# Patient Record
Sex: FEMALE | Race: BLACK OR AFRICAN AMERICAN | Employment: FULL TIME | URBAN - METROPOLITAN AREA
[De-identification: names, ages, dates, MRNs, and addresses within clinical notes are randomized per-mention and may not be internally consistent; named-entity substitution may affect disease eponyms.]

---

## 2017-10-26 ENCOUNTER — HOSPITAL ENCOUNTER (OUTPATIENT)
Dept: PHYSICAL THERAPY | Age: 47
Discharge: HOME | End: 2017-10-26
Payer: COMMERCIAL

## 2017-10-26 PROCEDURE — 97161 PT EVAL LOW COMPLEX 20 MIN: CPT | Performed by: PHYSICAL THERAPIST

## 2017-10-26 NOTE — PROGRESS NOTES
[facility] PHYSICAL THERAPY - DAILY TREATMENT NOTE    Patient Name: Stephania Leyva        Date: 10/26/2017  : 1970   yes Patient  Verified  Visit #:     Insurance: Payor: Amara Mata / Plan: [employer] / Product Type: HMO /      In time: 555 Out time: 630   Total Treatment Time: 35     Medicare Time Tracking (below)   Total Timed Codes (min):  na 1:1 Treatment Time:  na     TREATMENT AREA =  Midline low back pain [M54.5]    SUBJECTIVE  Pain Level (on 0 to 10 scale):  ie  / 10   Medication Changes/New allergies or changes in medical history, any new surgeries or procedures?    no  If yes, update Summary List   Subjective Functional Status/Changes:  []  No changes reported     See ie          OBJECTIVE         min Patient Education:  yes  Reviewed HEP   []  Progressed/Changed HEP based on: Other Objective/Functional Measures:    Demo hep without increase in pain  See ie     Post Treatment Pain Level (on 0 to 10) scale:   ie  / 10     ASSESSMENT  Assessment/Changes in Function:     See ie     []  See Progress Note/Recertification   Patient will continue to benefit from skilled PT services to modify and progress therapeutic interventions, address functional mobility deficits, address ROM deficits, address strength deficits, analyze and address soft tissue restrictions, analyze and cue movement patterns, analyze and modify body mechanics/ergonomics, assess and modify postural abnormalities and instruct in home and community integration to attain remaining goals. Progress toward goals / Updated goals:    See ie     PLAN  []  Upgrade activities as tolerated yes Continue plan of care   []  Discharge due to :    []  Other:      Therapist: Debbie Hdez PT    Date: 10/26/2017 Time: 6:35 PM     No future appointments.

## 2017-10-30 ENCOUNTER — HOSPITAL ENCOUNTER (OUTPATIENT)
Dept: PHYSICAL THERAPY | Age: 47
Discharge: HOME | End: 2017-10-30
Payer: COMMERCIAL

## 2017-10-30 PROCEDURE — 97140 MANUAL THERAPY 1/> REGIONS: CPT | Performed by: PHYSICAL THERAPIST

## 2017-10-30 PROCEDURE — 97110 THERAPEUTIC EXERCISES: CPT | Performed by: PHYSICAL THERAPIST

## 2017-10-30 NOTE — PROGRESS NOTES
[facility] Castro PHYSICAL THERAPY - DAILY TREATMENT NOTE    Patient Name: Rachel Vanessa        Date: 10/30/2017  : 1970   yes Patient  Verified  Visit #:   2   of   8  Insurance: Payor: Makenna Marie / Plan: [address] PT / Product Type: Commerical /      In time: 758 Out time: 840   Total Treatment Time: 42     Medicare Time Tracking (below)   Total Timed Codes (min):  na 1:1 Treatment Time:  na     TREATMENT AREA =  Midline low back pain [M54.5]    SUBJECTIVE  Pain Level (on 0 to 10 scale):  6  / 10   Medication Changes/New allergies or changes in medical history, any new surgeries or procedures?    no  If yes, update Summary List   Subjective Functional Status/Changes:  []  No changes reported     I was in a 9/10 when I woke up this morning and it has slowly gotten better. I still cannot twist or bend.            OBJECTIVE  Modalities Rationale:     time constraint to improve patient's ability to time constraint    min [] Estim, type/location:                                      []  att     []  unatt     []  w/US     []  w/ice    []  w/heat    min []  Mechanical Traction: type/lbs                   []  pro   []  sup   []  int   []  cont    []  before manual    []  after manual    min []  Ultrasound, settings/location:      min []  Iontophoresis w/ dexamethasone, location:                                               []  take home patch       []  in clinic    min []  Ice     []  Heat    location/position:     min []  Vasopneumatic Device, press/temp:     min []  Other:    [] Skin assessment post-treatment (if applicable):    []  intact    []  redness- no adverse reaction     []redness  adverse reaction:        25 min Therapeutic Exercise:  [x]  See flow sheet   Rationale:      increase ROM and increase strength to improve the patients ability to complete adls     17 min Manual Therapy: Mfr lumbosacral junction, thoracolumbar/lumbosacral distraction, LS lamina release, grade I -ii R uniglides l1-4, stm ls paraspinals    Rationale:      decrease pain, increase ROM, increase tissue extensibility and decrease trigger points to improve patient's ability to complete adls       min Patient Education:  yes  Reviewed HEP   []  Progressed/Changed HEP based on: Other Objective/Functional Measures:    Increased soft tissue restrictions along L lumbosacral junction, cavitation noted at l4 with uniglides  Cues fore prone hip ext on form     Post Treatment Pain Level (on 0 to 10) scale:   2 / 10     ASSESSMENT  Assessment/Changes in Function:     Reduced pain noted after initial PT session      []  See Progress Note/Recertification   Patient will continue to benefit from skilled PT services to modify and progress therapeutic interventions, address functional mobility deficits, address ROM deficits, address strength deficits, analyze and address soft tissue restrictions, analyze and cue movement patterns, analyze and modify body mechanics/ergonomics, assess and modify postural abnormalities, address imbalance/dizziness and instruct in home and community integration to attain remaining goals.    Progress toward goals / Updated goals:    Pt compliant with hep     PLAN  []  Upgrade activities as tolerated yes Continue plan of care   []  Discharge due to :    []  Other:      Therapist: Joao Montanez PT    Date: 10/30/2017 Time: 8:37 AM     Future Appointments  Date Time Provider sImael Bravo   11/3/2017 2:00 PM Kike Polanco PT [facility]   11/7/2017 5:00 PM [address] [place] [address],  [address]   11/10/2017 10:30 AM Kike Polanco PT [facility]   11/14/2017 4:30 PM Joao Montanez PT [facility]   11/17/2017 6:30 AM Kike Polanco PT [facility]   11/20/2017 4:00 PM Joao Montanez PT [facility]   11/22/2017 4:30 PM Joao Montanez, PT [facility]

## 2017-11-03 ENCOUNTER — HOSPITAL ENCOUNTER (OUTPATIENT)
Dept: PHYSICAL THERAPY | Age: 47
Discharge: HOME | End: 2017-11-03
Payer: COMMERCIAL

## 2017-11-03 PROCEDURE — 97140 MANUAL THERAPY 1/> REGIONS: CPT

## 2017-11-03 PROCEDURE — 97110 THERAPEUTIC EXERCISES: CPT

## 2017-11-03 NOTE — PROGRESS NOTES
PHYSICAL THERAPY - DAILY TREATMENT NOTE    Patient Name: Michelle Vergara        Date: 11/3/2017  : 1970   YES Patient  Verified  Visit #:   3   of   8  Insurance: Payor: [address] [place] [id number] / Plan: [address] [place] [address] Rd PT / Product Type: Commerical /      In time: 2:00 Out time: 2:45   Total Treatment Time: 45     Medicare Time Tracking (below)   Total Timed Codes (min):  na 1:1 Treatment Time:  na     TREATMENT AREA =  Midline low back pain [M54.5]    SUBJECTIVE  Pain Level (on 0 to 10 scale):  4-5  / 10   Medication Changes/New allergies or changes in medical history, any new surgeries or procedures?     NO    If yes, update Summary List   Subjective Functional Status/Changes:  []  No changes reported     No new c/o          OBJECTIVE  Modalities Rationale:     decrease inflammation and decrease pain to improve patient's ability to perform ADLs   min [] Estim, type/location:                                      []  att     []  unatt     []  w/US     []  w/ice    []  w/heat    min []  Mechanical Traction: type/lbs                   []  pro   []  sup   []  int   []  cont    []  before manual    []  after manual    min []  Ultrasound, settings/location:      min []  Iontophoresis w/ dexamethasone, location:                                               []  take home patch       []  in clinic   10 min [x]  Ice     []  Heat    location/position:     min []  Vasopneumatic Device, press/temp:     min []  Other:    [x] Skin assessment post-treatment (if applicable):    [x]  intact    []  redness- no adverse reaction     []redness  adverse reaction:        25 min Therapeutic Exercise:  [x]  See flow sheet   Rationale:      increase ROM, increase strength and improve coordination to improve the patients ability to perform ADLs     10 min Manual Therapy: DTM para, L4-5 L ERS toño   Rationale:      decrease pain, increase ROM and increase tissue extensibility to improve patient's ability to perform ADLs       min Patient Education:  YES  Reviewed HEP   []  Progressed/Changed HEP based on: Other Objective/Functional Measures:    Increased soft tissue tension noted at L4-5 para      Post Treatment Pain Level (on 0 to 10) scale:   3-4  / 10     ASSESSMENT  Assessment/Changes in Function:     Good suni to all Rx without increase in pain      []  See Progress Note/Recertification   Patient will continue to benefit from skilled PT services to modify and progress therapeutic interventions, address functional mobility deficits, address ROM deficits, address strength deficits, analyze and address soft tissue restrictions, analyze and cue movement patterns, analyze and modify body mechanics/ergonomics and assess and modify postural abnormalities to attain remaining goals.    Progress toward goals / Updated goals:    No sig change towards LTGs      PLAN  []  Upgrade activities as tolerated YES Continue plan of care   []  Discharge due to :    []  Other:      Therapist: Brianna Mena, PT, OCS, SCS, CSCS    Date: 11/3/2017 Time: 1:31 PM       Future Appointments  Date Time Provider Ismael Bravo   11/3/2017 2:00 PM Jose Diaz, PT [facility]   11/7/2017 5:00 PM [address] [place] [address], [address] [place] [address]   11/10/2017 10:30 AM Jose Diaz PT [facility]   11/14/2017 4:30 PM Mary Mccrary, PT [facility]   11/17/2017 6:30 AM Jose Diaz, PT [facility]   11/20/2017 4:00 PM [address] [place] [address], [address] [place] [address]   11/22/2017 4:30 PM Mary Mccrary, PT [facility]

## 2017-11-07 ENCOUNTER — HOSPITAL ENCOUNTER (OUTPATIENT)
Dept: PHYSICAL THERAPY | Age: 47
Discharge: HOME | End: 2017-11-07
Payer: COMMERCIAL

## 2017-11-07 PROCEDURE — 97110 THERAPEUTIC EXERCISES: CPT | Performed by: PHYSICAL THERAPIST

## 2017-11-07 PROCEDURE — 97140 MANUAL THERAPY 1/> REGIONS: CPT | Performed by: PHYSICAL THERAPIST

## 2017-11-07 NOTE — PROGRESS NOTES
PHYSICAL THERAPY - DAILY TREATMENT NOTE    Patient Name: Edd Rust        Date: 2017  : 1970   yes Patient  Verified  Visit #:   4      8  Insurance: Payor: Unknown Ades / Plan: [address] PT / Product Type: Commerical /      In time: 4:58 Out time: 5:36   Total Treatment Time: 35     Medicare Time Tracking (below)   Total Timed Codes (min):  na 1:1 Treatment Time:  na     TREATMENT AREA =  Midline low back pain [M54.5]    SUBJECTIVE  Pain Level (on 0 to 10 scale):   10   Medication Changes/New allergies or changes in medical history, any new surgeries or procedures?    no  If yes, update Summary List   Subjective Functional Status/Changes:  []  No changes reported     After therapy I always feel good but then I wake up again the next morning and I'm in 9/10 pain all over again. OBJECTIVE  25 min Therapeutic Exercise:  [x]  See flow sheet   Rationale:      increase ROM and increase strength to improve the patients ability to perform ADLs     10 min Manual Therapy: dtm lumbar spinals, lumbar roll l3/4 and facet stretch   Rationale:      decrease pain, increase ROM, increase tissue extensibility and decrease trigger points to improve patient's ability to complete ADLs     min Patient Education:  yes  Reviewed HEP, sleeping posture   []  Progressed/Changed HEP based on:        Other Objective/Functional Measures:    Increased L paraspinal tightness upper lumbar region  Cavitation following dtm and improved pain with facet stretch   Post Treatment Pain Level (on 0 to 10) scale:   2  / 10     ASSESSMENT  Assessment/Changes in Function:     Pt continues with morning pain that is alleviated at end of day and with pt sessions     []  See Progress Note/Recertification   Patient will continue to benefit from skilled PT services to modify and progress therapeutic interventions, address functional mobility deficits, address ROM deficits, address strength deficits, analyze and address soft tissue restrictions, analyze and cue movement patterns and assess and modify postural abnormalities to attain remaining goals.    Progress toward goals / Updated goals:    STG #1 - compliant with HEP complete     PLAN  []  Upgrade activities as tolerated yes Continue plan of care   []  Discharge due to :    []  Other:      Therapist: [place]    Date: 11/7/2017 Time: 5:02 PM     Future Appointments  Date Time Provider Ismael Bravo   11/10/2017 10:30 AM Prasanna Guzmán, PT [facility]   11/14/2017 4:30 PM Allyssa Live, PT [facility]   11/17/2017 6:30 AM Prasanna Guzmán, PT [facility]   11/20/2017 4:00 PM Allyssa Live, PT [facility]   11/22/2017 4:30 PM Allyssa Live, PT [facility]

## 2017-11-10 ENCOUNTER — HOSPITAL ENCOUNTER (OUTPATIENT)
Dept: PHYSICAL THERAPY | Age: 47
Discharge: HOME | End: 2017-11-10
Payer: COMMERCIAL

## 2017-11-10 PROCEDURE — 97110 THERAPEUTIC EXERCISES: CPT

## 2017-11-10 PROCEDURE — 97140 MANUAL THERAPY 1/> REGIONS: CPT

## 2017-11-10 NOTE — PROGRESS NOTES
PHYSICAL THERAPY - DAILY TREATMENT NOTE    Patient Name: Trisha Apt        Date: 11/10/2017  : 1970   YES Patient  Verified  Visit #:   5   of   8  Insurance: Payor: Edgar Medico / [address] / Product Type: Commerical /      In time: 10:30 Out time: 11:15   Total Treatment Time: 45     Medicare Time Tracking (below)   Total Timed Codes (min):  na 1:1 Treatment Time:  na     TREATMENT AREA =  Midline low back pain [M54.5]    SUBJECTIVE  Pain Level (on 0 to 10 scale):  5  / 10   Medication Changes/New allergies or changes in medical history, any new surgeries or procedures?     NO    If yes, update Summary List   Subjective Functional Status/Changes:  []  No changes reported     No new c/o          OBJECTIVE  Modalities Rationale:     decrease inflammation and decrease pain to improve patient's ability to perform ADLs                min [] Estim, type/location:                                       []  att     []  unatt     []  w/US     []  w/ice    []  w/heat     min []  Mechanical Traction: type/lbs                    []  pro   []  sup   []  int   []  cont    []  before manual    []  after manual     min []  Ultrasound, settings/location:        min []  Iontophoresis w/ dexamethasone, location:                                                []  take home patch       []  in clinic   10 min [x]  Ice     []  Heat    location/position:       min []  Vasopneumatic Device, press/temp:       min []  Other:     [x] Skin assessment post-treatment (if applicable):    [x]  intact    []  redness- no adverse reaction     []redness  adverse reaction:         25 min Therapeutic Exercise:  [x]  See flow sheet   Rationale:      increase ROM, increase strength and improve coordination to improve the patients ability to perform ADLs      10 min Manual Therapy: DTM para, L5 L ERS toño, L on R SI toño   Rationale:      decrease pain, increase ROM and increase tissue extensibility to improve patient's ability to perform ADLs        min Patient Education:  YES  Reviewed HEP   []  Progressed/Changed HEP based on: Other Objective/Functional Measures:    Cont to have increased soft tissue tension noted      Post Treatment Pain Level (on 0 to 10) scale:   3  / 10     ASSESSMENT  Assessment/Changes in Function:     Good suni to all Rx without increase in pain      []  See Progress Note/Recertification   Patient will continue to benefit from skilled PT services to modify and progress therapeutic interventions, address functional mobility deficits, address ROM deficits, address strength deficits, analyze and address soft tissue restrictions, analyze and cue movement patterns, analyze and modify body mechanics/ergonomics and assess and modify postural abnormalities to attain remaining goals.    Progress toward goals / Updated goals:    No sig change towards LTGs      PLAN  []  Upgrade activities as tolerated YES Continue plan of care   []  Discharge due to :    []  Other:      Therapist: Lorie Etienne, PT, OCS, SCS, CSCS    Date: 11/10/2017 Time: 8:00 AM       Future Appointments  Date Time Provider Ismael Bravo   11/10/2017 10:30 AM Dejon Isidro PT [facility]   11/14/2017 4:30 PM Jennifer Diaz PT [facility]   11/17/2017 6:30 AM Dejon Isidro PT [facility]   11/20/2017 4:00 PM [address] [place] [address], [address]   11/22/2017 4:30 PM Jennifer Diaz PT [facility]

## 2017-11-14 ENCOUNTER — HOSPITAL ENCOUNTER (OUTPATIENT)
Dept: PHYSICAL THERAPY | Age: 47
Discharge: HOME | End: 2017-11-14
Payer: COMMERCIAL

## 2017-11-14 PROCEDURE — 97140 MANUAL THERAPY 1/> REGIONS: CPT | Performed by: PHYSICAL THERAPIST

## 2017-11-14 PROCEDURE — 97110 THERAPEUTIC EXERCISES: CPT | Performed by: PHYSICAL THERAPIST

## 2017-11-14 NOTE — PROGRESS NOTES
PHYSICAL THERAPY - DAILY TREATMENT NOTE    Patient Name: Silvestre Orlando        Date: 2017  : 1970   yes Patient  Verified  Visit #:   6   of   8  Insurance: Payor: Marco Antonio Peralta / Plan: [address] PT / Product Type: Commerical /      In time:  Out time:    Total Treatment Time: 60     Medicare Time Tracking (below)   Total Timed Codes (min):  na 1:1 Treatment Time:  na     TREATMENT AREA =  Midline low back pain [M54.5]    SUBJECTIVE  Pain Level (on 0 to 10 scale):  4  / 10   Medication Changes/New allergies or changes in medical history, any new surgeries or procedures?    no  If yes, update Summary List   Subjective Functional Status/Changes:  []  No changes reported     The hands-on work that you all have been doing really seems to be helping.        OBJECTIVE  Modalities Rationale:     decrease pain and increase tissue extensibility to improve patient's ability to perform ADLs   min [] Estim, type/location:                                      []  att     []  unatt     []  w/US     []  w/ice    []  w/heat    min []  Mechanical Traction: type/lbs                   []  pro   []  sup   []  int   []  cont    []  before manual    []  after manual    min []  Ultrasound, settings/location:      min []  Iontophoresis w/ dexamethasone, location:                                               []  take home patch       []  in clinic   10 min []  Ice     [x]  Heat    location/position: Prone, L/s    min []  Vasopneumatic Device, press/temp:     min []  Other:    [x] Skin assessment post-treatment (if applicable):    [x]  intact    []  redness- no adverse reaction     []redness  adverse reaction:        30 min Therapeutic Exercise:  [x]  See flow sheet   Rationale:      increase ROM, increase strength and improve coordination to improve the patients ability to perform ADLs     20 min Manual Therapy: CFM/DTM L lumbosacral junction; sacral flexion   Rationale:      decrease pain, increase ROM, increase tissue extensibility and decrease trigger points to improve patient's ability to perform ADLs     min Patient Education:  yes  Reviewed HEP   []  Progressed/Changed HEP based on: Other Objective/Functional Measures:    Decreased AROM and p! With L lateral flexion, R rotation. Relief with extension and ROM WFL  TTP L paraspinals and L sacrum/PSIS     Post Treatment Pain Level (on 0 to 10) scale:   2  / 10     ASSESSMENT  Assessment/Changes in Function:     Patient reported that she still feels max pain of 8/10 in the morning when she wakes up but that her overall function is increasing as therapy progresses: FOTO of 83/100 reported. TE was progressed today with an emphasis on stabilizing l/s and sacrum. []  See Progress Note/Recertification   Patient will continue to benefit from skilled PT services to modify and progress therapeutic interventions, address functional mobility deficits, address ROM deficits, address strength deficits, analyze and address soft tissue restrictions, analyze and cue movement patterns and analyze and modify body mechanics/ergonomics to attain remaining goals.    Progress toward goals / Updated goals:    Progressing towards goals     PLAN  [x]  Upgrade activities as tolerated yes Continue plan of care   []  Discharge due to :    []  Other:      Therapist: Robert Espino    Date: 11/14/2017 Time: 5:05 PM     Future Appointments  Date Time Provider Ismael Bravo   11/17/2017 6:30 AM Mira Guerrero, MASSIEL [facility]   11/20/2017 4:00 PM Dede Johnson PT [facility]   11/22/2017 4:30 PM Dede Johnson PT [address] [facility]

## 2017-11-17 ENCOUNTER — HOSPITAL ENCOUNTER (OUTPATIENT)
Dept: PHYSICAL THERAPY | Age: 47
Discharge: HOME | End: 2017-11-17
Payer: COMMERCIAL

## 2017-11-17 PROCEDURE — 97140 MANUAL THERAPY 1/> REGIONS: CPT

## 2017-11-17 PROCEDURE — 97110 THERAPEUTIC EXERCISES: CPT

## 2017-11-17 NOTE — PROGRESS NOTES
PHYSICAL THERAPY - DAILY TREATMENT NOTE    Patient Name: Lynda Valentin        Date: 2017  : 1970   YES Patient  Verified  Visit #:   7      8  Insurance: Payor: Yassine Aguilar / Plan:  [address] PT / Product Type: Commerical /      In time: 6:30 Out time: 7:17   Total Treatment Time: 47     Medicare Time Tracking (below)   Total Timed Codes (min):  na 1:1 Treatment Time:  na     TREATMENT AREA =  Midline low back pain [M54.5]    SUBJECTIVE  Pain Level (on 0 to 10 scale):  7  / 10   Medication Changes/New allergies or changes in medical history, any new surgeries or procedures? NO    If yes, update Summary List   Subjective Functional Status/Changes:  []  No changes reported     No specific activity makes my pain worse. It just hurts in the morning.   8/10 at the worst.           OBJECTIVE  Modalities Rationale:     decrease pain to improve patient's ability to perform ADLs   min [] Estim, type/location:                                      []  att     []  unatt     []  w/US     []  w/ice    []  w/heat    min []  Mechanical Traction: type/lbs                   []  pro   []  sup   []  int   []  cont    []  before manual    []  after manual    min []  Ultrasound, settings/location:      min []  Iontophoresis w/ dexamethasone, location:                                               []  take home patch       []  in clinic   10 min []  Ice     [x]  Heat    location/position:     min []  Vasopneumatic Device, press/temp:     min []  Other:    [x] Skin assessment post-treatment (if applicable):    [x]  intact    []  redness- no adverse reaction     []redness  adverse reaction:        27 min Therapeutic Exercise:  [x]  See flow sheet   Rationale:      increase ROM, increase strength and improve coordination to improve the patients ability to perform ADLs     10 min Manual Therapy: DTM para, piri L5 L ERS, L on L Si toño   Rationale:      decrease pain, increase ROM and increase tissue extensibility to improve patient's ability to perform ADLs       min Patient Education:  YES  Reviewed HEP   []  Progressed/Changed HEP based on: Other Objective/Functional Measures:  Cont to demonstrate ~50% limitation in Ext and ~30% limitation in L rot  Cont to have TTP at [facility] and para     Post Treatment Pain Level (on 0 to 10) scale:   0  / 10     ASSESSMENT  Assessment/Changes in Function:     Good suni to all Rx without increase in pain      []  See Progress Note/Recertification   Patient will continue to benefit from skilled PT services to modify and progress therapeutic interventions, address functional mobility deficits, address ROM deficits, address strength deficits, analyze and address soft tissue restrictions, analyze and cue movement patterns, analyze and modify body mechanics/ergonomics and assess and modify postural abnormalities to attain remaining goals.    Progress toward goals / Updated goals:    Progressing slowly with pain reduction      PLAN  []  Upgrade activities as tolerated YES Continue plan of care   []  Discharge due to :    []  Other:      Therapist: Red Shaikh, PT, OCS, SCS, CSCS    Date: 11/17/2017 Time: 6:30 AM       Future Appointments  Date Time Provider Ismael Bravo   11/20/2017 4:00 PM Debbie Hdez PT [facility]   11/22/2017 4:30 PM Debbie Hdez PT [facility]

## 2017-11-20 ENCOUNTER — HOSPITAL ENCOUNTER (OUTPATIENT)
Dept: PHYSICAL THERAPY | Age: 47
End: 2017-11-20
Payer: COMMERCIAL

## 2017-11-22 ENCOUNTER — HOSPITAL ENCOUNTER (OUTPATIENT)
Dept: PHYSICAL THERAPY | Age: 47
Discharge: HOME | End: 2017-11-22
Payer: COMMERCIAL

## 2017-11-22 PROCEDURE — 97535 SELF CARE MNGMENT TRAINING: CPT | Performed by: PHYSICAL THERAPIST

## 2017-11-22 PROCEDURE — 97110 THERAPEUTIC EXERCISES: CPT | Performed by: PHYSICAL THERAPIST

## 2017-11-22 PROCEDURE — 97140 MANUAL THERAPY 1/> REGIONS: CPT | Performed by: PHYSICAL THERAPIST

## 2017-11-22 NOTE — PROGRESS NOTES
PHYSICAL THERAPY - DAILY TREATMENT NOTE    Patient Name: Izabela Pack        Date: 2017  : 1970   yes Patient  Verified  Visit #:     Insurance: Payor: Rosaura Gregory / Plan:  [address] [facility] / Product Type: Commerical /      In time: 4:30 Out time: 5:32   Total Treatment Time: 60     Medicare Time Tracking (below)   Total Timed Codes (min):  na 1:1 Treatment Time:  na     TREATMENT AREA =  Midline low back pain [M54.5]    SUBJECTIVE  Pain Level (on 0 to 10 scale):  6.5-7  / 10   Medication Changes/New allergies or changes in medical history, any new surgeries or procedures?    no  If yes, update Summary List   Subjective Functional Status/Changes:  []  No changes reported     I woke up this morning with 12/10 pain it was like oh my god painful; it felt better after taking the hottest shower possible.        OBJECTIVE  Modalities Rationale:     decrease inflammation, decrease pain and increase tissue extensibility to improve patient's ability to perform ADLs   min [] Estim, type/location:                                      []  att     []  unatt     []  w/US     []  w/ice    []  w/heat    min []  Mechanical Traction: type/lbs                   []  pro   []  sup   []  int   []  cont    []  before manual    []  after manual    min []  Ultrasound, settings/location:      min []  Iontophoresis w/ dexamethasone, location:                                               []  take home patch       []  in clinic   10 min []  Ice     [x]  Heat    location/position: Prone, l/s    min []  Vasopneumatic Device, press/temp:     min []  Other:    [x] Skin assessment post-treatment (if applicable):    [x]  intact    []  redness- no adverse reaction     []redness  adverse reaction:        22 min Therapeutic Exercise:  [x]  See flow sheet   Rationale:      increase ROM and increase strength to improve the patients ability to perform ADLs      18 min Manual Therapy: DTM L l/s t/s paraspinals, TPR R piri/GM, L on L SI toño   Rationale:      decrease pain, increase ROM, increase tissue extensibility and decrease trigger points to improve patient's ability to perform ADLs    10 min Patient Education:  yes  Reviewed HEP, sleeping posture for LBP, bed mobility to maintain neutral spine   []  Progressed/Changed HEP based on: Other Objective/Functional Measures:    Limited AROM ext/L rot/flexion slight R deviation  Tightness L>R paraspinals l/s and t/s  Sacrum L rotation  TTP R piriformis, L paraspinals     Post Treatment Pain Level (on 0 to 10) scale:   0  / 10     ASSESSMENT  Assessment/Changes in Function:     Pt still reporting high (12/10) pain levels after getting out of bed in the morning. Pain improves with a hot shower and with activity. PT reviewed sleeping posture and bed mobility upon waking to promote proper technique for stability during transfers. []  See Progress Note/Recertification   Patient will continue to benefit from skilled PT services to modify and progress therapeutic interventions, address functional mobility deficits, address ROM deficits, address strength deficits, analyze and address soft tissue restrictions, analyze and cue movement patterns, analyze and modify body mechanics/ergonomics and assess and modify postural abnormalities to attain remaining goals.    Progress toward goals / Updated goals:    Slow progression with goals     PLAN  []  Upgrade activities as tolerated yes Continue plan of care   []  Discharge due to :    []  Other:      Therapist: MICHAEL Pendleton DPT, CMT    Date: 11/22/2017 Time: 5:10 PM     Future Appointments  Date Time Provider Ismael Bravo   12/1/2017 9:00 AM Jean Carlos Ortiz, PT [facility]

## 2017-12-01 ENCOUNTER — HOSPITAL ENCOUNTER (OUTPATIENT)
Dept: PHYSICAL THERAPY | Age: 47
Discharge: HOME | End: 2017-12-01
Payer: COMMERCIAL

## 2017-12-01 PROCEDURE — 97140 MANUAL THERAPY 1/> REGIONS: CPT

## 2017-12-01 PROCEDURE — 97110 THERAPEUTIC EXERCISES: CPT

## 2017-12-01 NOTE — PROGRESS NOTES
PHYSICAL THERAPY - DAILY TREATMENT NOTE    Patient Name: Lynsey Santamaria        Date: 2017  : 1970   YES Patient  Verified  Visit #:   9   of     Insurance: Payor: Allie Talavera / Plan:  [address] [facility] / Product Type: Commerical /      In time: 7:28 Out time: 8:03   Total Treatment Time: 35     Medicare Time Tracking (below)   Total Timed Codes (min):  na 1:1 Treatment Time:  na     TREATMENT AREA =  Midline low back pain [M54.5]    SUBJECTIVE  Pain Level (on 0 to 10 scale):  6  / 10   Medication Changes/New allergies or changes in medical history, any new surgeries or procedures? NO    If yes, update Summary List   Subjective Functional Status/Changes:  []  No changes reported     No new c/o          OBJECTIVE       25 min Therapeutic Exercise:  [x]  See flow sheet   Rationale:      increase ROM, increase strength and improve coordination to improve the patients ability to perform ADLs      10 min Manual Therapy: DTM para, QL L5 L ERS   Rationale:      decrease pain, increase ROM and increase tissue extensibility to improve patient's ability to perform ADLs     min Patient Education:  YES  Reviewed HEP   []  Progressed/Changed HEP based on: Other Objective/Functional Measures:    Cont to have increased soft tissue tension noted at L QL, para     Post Treatment Pain Level (on 0 to 10) scale:   0  / 10     ASSESSMENT  Assessment/Changes in Function:     Good suni to all Rx without increase in pain      []  See Progress Note/Recertification   Patient will continue to benefit from skilled PT services to modify and progress therapeutic interventions, address functional mobility deficits, address ROM deficits, address strength deficits, analyze and address soft tissue restrictions, analyze and cue movement patterns, analyze and modify body mechanics/ergonomics and assess and modify postural abnormalities to attain remaining goals.    Progress toward goals / Updated goals:    Slow progress with pain reduction      PLAN  []  Upgrade activities as tolerated YES Continue plan of care   []  Discharge due to :    []  Other:      Therapist: Stephanie Miguel, PT, OCS, SCS, CSCS    Date: 12/1/2017 Time: 6:30 AM       Future Appointments  Date Time Provider Ismael Bravo   12/1/2017 7:30 AM Casper Sánchez PT [facility]   12/5/2017 5:30 PM [address] [place] [address]   12/8/2017 3:00 PM Casper Sánchez PT [facility]   12/12/2017 4:00 PM Casper Sánchez PT [facility]   12/15/2017 3:00 PM Casper Sánchez PT [facility]

## 2017-12-05 ENCOUNTER — HOSPITAL ENCOUNTER (OUTPATIENT)
Dept: PHYSICAL THERAPY | Age: 47
Discharge: HOME | End: 2017-12-05
Payer: COMMERCIAL

## 2017-12-05 PROCEDURE — 97140 MANUAL THERAPY 1/> REGIONS: CPT | Performed by: PHYSICAL THERAPIST

## 2017-12-05 PROCEDURE — 97110 THERAPEUTIC EXERCISES: CPT | Performed by: PHYSICAL THERAPIST

## 2017-12-05 NOTE — PROGRESS NOTES
PHYSICAL THERAPY - DAILY TREATMENT NOTE    Patient Name: Lynsey Santamaria        Date: 2017  : 1970   yes Patient  Verified  Visit #:   10   of   18  Insurance: Payor: Allie Talavera / [facility] / Product Type: Commerical /      In time: 5:30 Out time: 612   Total Treatment Time: 42     Medicare Time Tracking (below)   Total Timed Codes (min):  na 1:1 Treatment Time:  na     TREATMENT AREA =  Midline low back pain [M54.5]    SUBJECTIVE  Pain Level (on 0 to 10 scale):   10   Medication Changes/New allergies or changes in medical history, any new surgeries or procedures?    no  If yes, update Summary List   Subjective Functional Status/Changes:  []  No changes reported     See pn       OBJECTIVE      27 min Therapeutic Exercise:  [x]  See flow sheet   Rationale:      increase ROM and increase strength to improve the patients ability to perform ADLs      15 min Manual Therapy: DTM L l/s t/s paraspinals, L ql release, sacral fleion mobs, grade iii pa mobs l3-5, B GM release   Rationale:      decrease pain, increase ROM, increase tissue extensibility and decrease trigger points to improve patient's ability to perform ADLs    10 min Patient Education:  yes  Reviewed HEP, sleeping posture for LBP, bed mobility to maintain neutral spine   []  Progressed/Changed HEP based on: Other Objective/Functional Measures:    See pn     Post Treatment Pain Level (on 0 to 10) scale:   0  / 10     ASSESSMENT  Assessment/Changes in Function:     See pn     []  See Progress Note/Recertification   Patient will continue to benefit from skilled PT services to modify and progress therapeutic interventions, address functional mobility deficits, address ROM deficits, address strength deficits, analyze and address soft tissue restrictions, analyze and cue movement patterns, analyze and modify body mechanics/ergonomics and assess and modify postural abnormalities to attain remaining goals.    Progress toward goals / Updated goals:    See pn     PLAN  []  Upgrade activities as tolerated yes Continue plan of care   []  Discharge due to :    []  Other:      Therapist: Daniel Jennings, PT, SPT  SARIKA AllisonT, CMT    Date: 12/5/2017 Time: 5:10 PM     Future Appointments  Date Time Provider sImael Bravo   12/8/2017 3:00 PM Jean Carlos Ortiz PT [facility]   12/12/2017 4:00 PM Jean Carlos Ortiz PT [facility]   12/15/2017 3:00 PM Jean Carlos Ortiz PT [facility]

## 2017-12-05 NOTE — PROGRESS NOTES
.[facility] [address] PHYSICAL THERAPY  [address] [place] [address],[facility], [address] - Phone: [phone]  Fax: [phone]  PROGRESS NOTE  Patient Name: David Murphy : [birth year]   Treatment/Medical Diagnosis: Midline low back pain [M54.5]   Referral Source: Alysa Herreras, *     Date of Initial Visit: 10/26/17 Attended Visits: 10 Missed Visits: 1     SUMMARY OF TREATMENT  Pt was seen for IE and 9 f/u sessions with treatment consisting of therapeutic exercises for lumbar rom/core stability, manual therapy (prom/mobs/stm) and modalities prn. In addition pt was instructed on hep  CURRENT STATUS  Pt has made slow but steady progress with PT. Reports 60% improvement despite continued pain 0-7/10 that increased in morning and with lifting. arom still limited in ext/l rotation by 25% with pain at end range. Continues with decreased l3-5 pain and upglides. Decreased multifidi activation, glute med strength noted. Would like to continue with therapy an additional 3-4 weeks to address goals below     Goal/Measure of Progress Goal Met? 1. Pt will increase FOTO by 22 points in order to show functional improvement   Status at last Eval: 53/100 Current Status: 83/100 yes   2. Pt will increase ls arom wnl all directions in order to increase participation in adls   Status at last Eval: ls ext/ lrot 50% Current Status: Ext, l rot 25% progressing   3. Pt will note daily max pain </=3/10 in order to increase participation in adls   Status at last Eval: 10/10 Current Status: 710 progressing     New Goals to be achieved in __3-4__  weeks:  1. Cont as above  2.   3.   RECOMMENDATIONS  Cont PT an additional 2x 3-4 weeks   If you have any questions/comments please contact us directly at [phone]. Thank you for allowing us to assist in the care of your patient.     Therapist Signature: Ian Clement DPT, CMT Date: 2017     Time: 6:33 PM   NOTE TO PHYSICIAN:  PLEASE COMPLETE THE ORDERS BELOW AND FAX TO   [facility]: [phone]  If you are unable to process this request in 24 hours please contact our office: [phone]    ___ I have read the above report and request that my patient continue as recommended.   ___ I have read the above report and request that my patient continue therapy with the following changes/special instructions:_________________________________________________________   ___ I have read the above report and request that my patient be discharged from therapy.      Physician Signature:        Date:       Time:

## 2017-12-08 ENCOUNTER — HOSPITAL ENCOUNTER (OUTPATIENT)
Dept: PHYSICAL THERAPY | Age: 47
Discharge: HOME | End: 2017-12-08
Payer: COMMERCIAL

## 2017-12-12 ENCOUNTER — HOSPITAL ENCOUNTER (OUTPATIENT)
Dept: PHYSICAL THERAPY | Age: 47
Discharge: HOME | End: 2017-12-12
Payer: COMMERCIAL

## 2017-12-12 PROCEDURE — 97110 THERAPEUTIC EXERCISES: CPT

## 2017-12-12 PROCEDURE — 97140 MANUAL THERAPY 1/> REGIONS: CPT

## 2017-12-12 NOTE — PROGRESS NOTES
PHYSICAL THERAPY - DAILY TREATMENT NOTE    Patient Name: Pedro Hanks        Date: 2017  : 1970   YES Patient  Verified  Visit #:     Insurance: Payor: Bhupinder Rebolledo / Plan: [address] [place] [address] [facility] / Product Type: Commerical /      In time: 4:00 Out time: 4:42   Total Treatment Time: 42     Medicare Time Tracking (below)   Total Timed Codes (min):  na 1:1 Treatment Time:  na     TREATMENT AREA =  Midline low back pain [M54.5]    SUBJECTIVE  Pain Level (on 0 to 10 scale):  0  / 10   Medication Changes/New allergies or changes in medical history, any new surgeries or procedures? NO    If yes, update Summary List   Subjective Functional Status/Changes:  []  No changes reported     I still hurt first thing in the morning, but as the day goes, it gets better          OBJECTIVE  30 min Therapeutic Exercise:  [x]  See flow sheet   Rationale:      increase ROM, increase strength and improve coordination to improve the patients ability to perform ADLs       12 min Manual Therapy: DTM para, QL    Rationale:      decrease pain, increase ROM and increase tissue extensibility to improve patient's ability to perform ADLs     min Patient Education:  YES  Reviewed HEP   []  Progressed/Changed HEP based on: Other Objective/Functional Measures:    FOTO = 83     Post Treatment Pain Level (on 0 to 10) scale:   0  / 10     ASSESSMENT  Assessment/Changes in Function:     Good suni to all Rx without increase in pain      []  See Progress Note/Recertification   Patient will continue to benefit from skilled PT services to modify and progress therapeutic interventions, address functional mobility deficits, address ROM deficits, address strength deficits, analyze and address soft tissue restrictions, analyze and cue movement patterns, analyze and modify body mechanics/ergonomics and assess and modify postural abnormalities to attain remaining goals.    Progress toward goals / Updated goals:    Progressing well with pain reduction      PLAN  []  Upgrade activities as tolerated YES Continue plan of care   []  Discharge due to :    []  Other:      Therapist: Marichuy Venegas, PT, OCS, SCS, CSCS    Date: 12/12/2017 Time: 3:47 PM       Future Appointments  Date Time Provider Ismael Bravo   12/12/2017 4:00 PM Maikel Meyers PT [facility]   12/15/2017 3:00 PM Maikel Meyers PT [facility]

## 2017-12-14 ENCOUNTER — HOSPITAL ENCOUNTER (OUTPATIENT)
Dept: PHYSICAL THERAPY | Age: 47
Discharge: HOME | End: 2017-12-14
Payer: COMMERCIAL

## 2017-12-14 PROCEDURE — 97110 THERAPEUTIC EXERCISES: CPT | Performed by: PHYSICAL THERAPIST

## 2017-12-14 PROCEDURE — 97140 MANUAL THERAPY 1/> REGIONS: CPT | Performed by: PHYSICAL THERAPIST

## 2017-12-14 NOTE — PROGRESS NOTES
PHYSICAL THERAPY - DAILY TREATMENT NOTE    Patient Name: Lola Bah        Date: 2017  : 1970   yes Patient  Verified  Visit #:     Insurance: Payor: Hiren Fleming / Plan: [address] / Product Type: Commerical /      In time: 400 Out time: 456   Total Treatment Time: 50     Medicare Time Tracking (below)   Total Timed Codes (min):  na 1:1 Treatment Time:  na     TREATMENT AREA =  Midline low back pain [M54.5]    SUBJECTIVE  Pain Level (on 0 to 10 scale):  0/ 10   Medication Changes/New allergies or changes in medical history, any new surgeries or procedures?    no  If yes, update Summary List   Subjective Functional Status/Changes:  []  No changes reported     I actually did not have that much pain when I woke up this morning. Its still thee       OBJECTIVE      35 min Therapeutic Exercise:  [x]  See flow sheet   Rationale:      increase ROM and increase strength to improve the patients ability to perform ADLs      15 min Manual Therapy: DTM L l/s t/s paraspinals, L ql release, sacral fleion mobs, grade iii pa mobs l3-5, B GM release   Rationale:      decrease pain, increase ROM, increase tissue extensibility and decrease trigger points to improve patient's ability to perform ADLs     min Patient Education:  yes  Reviewed HEP   []  Progressed/Changed HEP based on:        Other Objective/Functional Measures:  Slight tenderness and restrictions along L side paraspinals and QL but ls arom wnl all directions  Denied any increase in s/s        Post Treatment Pain Level (on 0 to 10) scale:   0  / 10     ASSESSMENT  Assessment/Changes in Function:     Pt still continues with AM pain that is alleviated throughout day      []  See Progress Note/Recertification   Patient will continue to benefit from skilled PT services to modify and progress therapeutic interventions, address functional mobility deficits, address ROM deficits, address strength deficits, analyze and address soft tissue restrictions, analyze and cue movement patterns, analyze and modify body mechanics/ergonomics and assess and modify postural abnormalities to attain remaining goals. Progress toward goals / Updated goals:    Pt to advance hep/core stabilization program tomorrow and have one f/u visit to ensure I.  Anticipate discharge at that time     PLAN  []  Upgrade activities as tolerated yes Continue plan of care   []  Discharge due to :    []  Other:      Therapist: Momo Velez, PT    Date: 12/14/2017 Time: 5:10 PM     Future Appointments  Date Time Provider Ismael Bravo   12/15/2017 3:00 PM Queenie Chun, PT [facility]

## 2017-12-15 ENCOUNTER — HOSPITAL ENCOUNTER (OUTPATIENT)
Dept: PHYSICAL THERAPY | Age: 47
Discharge: HOME | End: 2017-12-15
Payer: COMMERCIAL

## 2017-12-15 PROCEDURE — 97110 THERAPEUTIC EXERCISES: CPT

## 2017-12-15 NOTE — PROGRESS NOTES
[address] PHYSICAL THERAPY  [address] [place] [address], [place] [address],[facility], [facility] - Phone: [phone]  Fax: [phone] SUMMARY  Patient Name: Miguel Angel Elliott : [birth year]   Treatment/Medical Diagnosis: Midline low back pain [M54.5]   Referral Source: Maikel Horne, *     Date of Initial Visit: 10/26/17 Attended Visits: 13 Missed Visits: 1     SUMMARY OF TREATMENT  Miguel Angel Elliott has been seen at our clinic 2-3x/wk for a total of 13 visits. Pt treatment has consisted of therapeutic exercise for lumbar ROM, hip/core strengthening, and manual therapy (jt mobilization and deep tissue mobilization)  CURRENT STATUS  Pt has had a good tolerance to physical therapy treatment. She reports significantly decreased level of pain is now near pain free. She continues to complain of minor pain when she first get up in the morning. However, we believe that she will be able to continue to progress with her pain reduction and function independently at this point. Goal/Measure of Progress Goal Met? 1. Decrease Max pain to </= 3/10 to assist with ADLs   Status at last Eval: 10/10 Current Status: 1/10 yes   2. Pt will increase ls arom wnl all directions in order to increase participation in adls   Status at last Eval: Ext, l rot 25% Current Status: WNL in all planes yes     RECOMMENDATIONS  Discharge from physical therapy treatment with HEP. Specifics:   If you have any questions/comments please contact us directly at [phone]. Thank you for allowing us to assist in the care of your patient.     Therapist Signature: Marley Tyler DPT, JERE, SCS, CSCS Date: 12/15/2017     Time: 4:20 PM

## 2017-12-15 NOTE — PROGRESS NOTES
PHYSICAL THERAPY - DAILY TREATMENT NOTE    Patient Name: Lana Black        Date: 12/15/2017  : 1970   YES Patient  Verified  Visit #:   15   of   18  Insurance: Payor: Fox Orosco / Plan: [address] [place] [address] Rd PT / Product Type: Commerical /      In time: 3:00 Out time: 3:25   Total Treatment Time: 25     Medicare Time Tracking (below)   Total Timed Codes (min):  na 1:1 Treatment Time:  na     TREATMENT AREA =  Midline low back pain [M54.5]    SUBJECTIVE  Pain Level (on 0 to 10 scale):  0  / 10   Medication Changes/New allergies or changes in medical history, any new surgeries or procedures? NO    If yes, update Summary List   Subjective Functional Status/Changes:  []  No changes reported     I still gets minor pain in the morning, but its minima.  l           OBJECTIVE  25 min Therapeutic Exercise:  [x]  See flow sheet   Rationale:      increase ROM, increase strength and improve coordination to improve the patients ability to perform ADLs          min Patient Education:  YES  Reviewed HEP   []  Progressed/Changed HEP based on:         Other Objective/Functional Measures:    Independent with all ex     Post Treatment Pain Level (on 0 to 10) scale:   0  / 10     ASSESSMENT  Assessment/Changes in Function:     See DC     []  See Progress Note/Recertification      Progress toward goals / Updated goals:    See DC     PLAN  []  Upgrade activities as tolerated YES Continue plan of care   [x]  Discharge due to : Met goals   []  Other:      Therapist: Stephanie Miguel, PT, OCS, SCS, CSCS    Date: 12/15/2017 Time: 6:39 AM       Future Appointments  Date Time Provider Ismael Bravo   12/15/2017 3:00 PM MASSIEL Coppola [facility]
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
